# Patient Record
Sex: FEMALE | Race: WHITE | Employment: FULL TIME | URBAN - METROPOLITAN AREA
[De-identification: names, ages, dates, MRNs, and addresses within clinical notes are randomized per-mention and may not be internally consistent; named-entity substitution may affect disease eponyms.]

---

## 2018-05-25 PROBLEM — R32 URINARY INCONTINENCE: Status: ACTIVE | Noted: 2018-05-25

## 2018-05-25 PROBLEM — R10.2 PELVIC PAIN: Status: ACTIVE | Noted: 2018-05-25

## 2020-11-18 ENCOUNTER — HOSPITAL ENCOUNTER (EMERGENCY)
Age: 35
Discharge: LEFT BEFORE DISCHARGE | End: 2020-11-18
Attending: EMERGENCY MEDICINE
Payer: COMMERCIAL

## 2020-11-18 VITALS
HEIGHT: 59 IN | OXYGEN SATURATION: 100 % | RESPIRATION RATE: 22 BRPM | TEMPERATURE: 98.6 F | BODY MASS INDEX: 22.18 KG/M2 | WEIGHT: 110 LBS | SYSTOLIC BLOOD PRESSURE: 135 MMHG | DIASTOLIC BLOOD PRESSURE: 84 MMHG | HEART RATE: 82 BPM

## 2020-11-18 DIAGNOSIS — Z53.21 ELOPED FROM EMERGENCY DEPARTMENT: ICD-10-CM

## 2020-11-18 DIAGNOSIS — R10.84 ABDOMINAL PAIN, GENERALIZED: Primary | ICD-10-CM

## 2020-11-18 PROCEDURE — 99283 EMERGENCY DEPT VISIT LOW MDM: CPT

## 2020-11-18 RX ORDER — SODIUM CHLORIDE 9 MG/ML
500 INJECTION, SOLUTION INTRAVENOUS CONTINUOUS
Status: DISCONTINUED | OUTPATIENT
Start: 2020-11-18 | End: 2020-11-18 | Stop reason: HOSPADM

## 2020-11-18 RX ORDER — MORPHINE SULFATE 4 MG/ML
4 INJECTION, SOLUTION INTRAMUSCULAR; INTRAVENOUS
Status: DISCONTINUED | OUTPATIENT
Start: 2020-11-18 | End: 2020-11-18 | Stop reason: HOSPADM

## 2020-11-18 RX ORDER — ONDANSETRON 2 MG/ML
4 INJECTION INTRAMUSCULAR; INTRAVENOUS
Status: DISCONTINUED | OUTPATIENT
Start: 2020-11-18 | End: 2020-11-18 | Stop reason: HOSPADM

## 2020-11-18 NOTE — ED NOTES
Pt seen walking at a rapid pace out of department, fully dressed, without any signs of distress or need for assistance.

## 2020-11-18 NOTE — ED NOTES
Patient calls on call bell to alert staff that she requires pain medication. She voices concerns that she is having an intestinal rupture. Provider made aware of patient's concerns.

## 2020-11-18 NOTE — ED TRIAGE NOTES
Patient c/o pain to lower abdomen that began approximately one hour ago. She states initially thinking that it may be gas pain and took simethicone. She states pain is \"not letting up\". Patient ambulatory in bent over position.

## 2020-11-18 NOTE — ED PROVIDER NOTES
HPI patient says she was sitting at work today when she developed a severe sharp pain in her mid abdomen. She said the pain was in the epigastric area and was like \"hard cramp\". She said the pain moved up into the so sternal region and also moved down and in her mid lower abdomen. She said symptoms lasted for about 30 minutes and have now slowly resolved. She says she still has the pain but is not as severe as earlier. She denies any symptoms of nausea or vomiting. She denies any changes in bowel or bladder habits. She says she has a past history of irritable bowel syndrome but that usually is manifest by cramping and diarrhea and the symptoms feel different. She took some simethicone when the symptoms 1st started but is taken no other medications. Past Medical History:   Diagnosis Date    Endometriosis     Hemorrhoids     IBS (irritable bowel syndrome)     Kidney stone        Past Surgical History:   Procedure Laterality Date    HX DILATION AND CURETTAGE      HX GYN  2010    ex lap uterus    HX ORTHOPAEDIC      Lt arm fracture         Family History:   Problem Relation Age of Onset    Colon Polyps Maternal Grandfather        Social History     Socioeconomic History    Marital status: SINGLE     Spouse name: Not on file    Number of children: Not on file    Years of education: Not on file    Highest education level: Not on file   Occupational History    Not on file   Social Needs    Financial resource strain: Not on file    Food insecurity     Worry: Not on file     Inability: Not on file    Transportation needs     Medical: Not on file     Non-medical: Not on file   Tobacco Use    Smoking status: Never Smoker    Smokeless tobacco: Never Used   Substance and Sexual Activity    Alcohol use: Yes     Alcohol/week: 0.0 standard drinks     Comment: occas.     Drug use: Not on file    Sexual activity: Not on file   Lifestyle    Physical activity     Days per week: Not on file     Minutes per session: Not on file    Stress: Not on file   Relationships    Social connections     Talks on phone: Not on file     Gets together: Not on file     Attends Confucianism service: Not on file     Active member of club or organization: Not on file     Attends meetings of clubs or organizations: Not on file     Relationship status: Not on file    Intimate partner violence     Fear of current or ex partner: Not on file     Emotionally abused: Not on file     Physically abused: Not on file     Forced sexual activity: Not on file   Other Topics Concern    Not on file   Social History Narrative    ** Merged History Encounter **              ALLERGIES: Latex; Iodinated contrast media; Iodine; Lexapro [escitalopram]; and Phenergan [promethazine]    Review of Systems   Constitutional: Negative. HENT: Negative. Respiratory: Negative. Cardiovascular: Negative. Gastrointestinal: Positive for abdominal pain. Genitourinary: Negative. Musculoskeletal: Negative. Neurological: Negative. Psychiatric/Behavioral: Negative. Vitals:    11/18/20 1145   BP: 135/84   Pulse: 82   Resp: 22   Temp: 98.6 °F (37 °C)   SpO2: 100%   Weight: 49.9 kg (110 lb)   Height: 4' 11\" (1.499 m)            Physical Exam  Vitals signs and nursing note reviewed. Constitutional:       Appearance: She is well-developed. HENT:      Head: Normocephalic and atraumatic. Nose: Nose normal.      Mouth/Throat:      Mouth: Mucous membranes are moist.   Eyes:      Conjunctiva/sclera: Conjunctivae normal.      Pupils: Pupils are equal, round, and reactive to light. Neck:      Musculoskeletal: Normal range of motion and neck supple. Cardiovascular:      Rate and Rhythm: Normal rate and regular rhythm. Pulmonary:      Effort: Pulmonary effort is normal.      Breath sounds: Normal breath sounds. Abdominal:      Palpations: Abdomen is soft. Musculoskeletal: Normal range of motion. Skin:     General: Skin is warm and dry. Neurological:      Mental Status: She is alert and oriented to person, place, and time. MDM  Number of Diagnoses or Management Options  Diagnosis management comments: Patient was very upset the about her interaction with the staff and the wait time she incurred while waiting for the physician to come see her. When I came in and examined her I was able to get her to calm down and to agree to stay in the emergency room for evaluation of her complaints. I completed the history and physical exam process and ordered some labs to be drawn. After I left the room and completed documentation in her chart. I was informed by the staff that she had eloped from the department.   Jade Mcintyre MD         Procedures

## 2020-11-18 NOTE — ED NOTES
Pt has hit the code blue light twice since being placed in the exam room. Upon my entry into the room, pt requesting ice water. Informed patient that she is not able to have anything to drink until she has been evaluated by the physician. I also informed patient that she is hitting an emergency light. Pt states \" I have been placed in this room without nobody to sit here with me. Nobody helped me change out of my clothes and I haven't been given any instruction. 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 I'm sitting here in pain that's 10/10\". Informed patient that the call bell, which is sitting on the stretcher next to her, is what is to be used to call staff. Also informed patient that once the physician see here and places orders to address her pain, I will be more than happy to administer it, but until he sees her, I can not give any pain medication. Pt verbalized understanding. Klaus Teague accompanied me in the room and witnessed this encounter. Approx 3 minutes after we left the room, patient rang on call bell, states it was an accident.

## 2022-03-19 PROBLEM — R10.2 PELVIC PAIN: Status: ACTIVE | Noted: 2018-05-25

## 2022-03-20 PROBLEM — R32 URINARY INCONTINENCE: Status: ACTIVE | Noted: 2018-05-25
